# Patient Record
Sex: MALE
[De-identification: names, ages, dates, MRNs, and addresses within clinical notes are randomized per-mention and may not be internally consistent; named-entity substitution may affect disease eponyms.]

---

## 2018-07-31 ENCOUNTER — HOSPITAL ENCOUNTER (INPATIENT)
Dept: HOSPITAL 5 - ED | Age: 43
LOS: 2 days | Discharge: HOME | DRG: 558 | End: 2018-08-02
Attending: INTERNAL MEDICINE | Admitting: INTERNAL MEDICINE
Payer: COMMERCIAL

## 2018-07-31 DIAGNOSIS — M71.161: Primary | ICD-10-CM

## 2018-07-31 DIAGNOSIS — Z72.89: ICD-10-CM

## 2018-07-31 DIAGNOSIS — E87.1: ICD-10-CM

## 2018-07-31 DIAGNOSIS — L03.115: ICD-10-CM

## 2018-07-31 DIAGNOSIS — R73.9: ICD-10-CM

## 2018-07-31 DIAGNOSIS — D72.829: ICD-10-CM

## 2018-07-31 LAB
BASOPHILS # (AUTO): 0 K/MM3 (ref 0–0.1)
BASOPHILS NFR BLD AUTO: 0.3 % (ref 0–1.8)
BUN SERPL-MCNC: 18 MG/DL (ref 9–20)
BUN/CREAT SERPL: 26 %
CALCIUM SERPL-MCNC: 9 MG/DL (ref 8.4–10.2)
EOSINOPHIL # BLD AUTO: 0.1 K/MM3 (ref 0–0.4)
EOSINOPHIL NFR BLD AUTO: 0.9 % (ref 0–4.3)
HCT VFR BLD CALC: 43.6 % (ref 35.5–45.6)
HEMOLYSIS INDEX: 9
HGB BLD-MCNC: 14.6 GM/DL (ref 11.8–15.2)
LYMPHOCYTES # BLD AUTO: 2 K/MM3 (ref 1.2–5.4)
LYMPHOCYTES NFR BLD AUTO: 17 % (ref 13.4–35)
MCH RBC QN AUTO: 32 PG (ref 28–32)
MCHC RBC AUTO-ENTMCNC: 34 % (ref 32–34)
MCV RBC AUTO: 96 FL (ref 84–94)
MONOCYTES # (AUTO): 1.1 K/MM3 (ref 0–0.8)
MONOCYTES % (AUTO): 9.5 % (ref 0–7.3)
PLATELET # BLD: 269 K/MM3 (ref 140–440)
RBC # BLD AUTO: 4.53 M/MM3 (ref 3.65–5.03)

## 2018-07-31 PROCEDURE — 83036 HEMOGLOBIN GLYCOSYLATED A1C: CPT

## 2018-07-31 PROCEDURE — 80053 COMPREHEN METABOLIC PANEL: CPT

## 2018-07-31 PROCEDURE — 90715 TDAP VACCINE 7 YRS/> IM: CPT

## 2018-07-31 PROCEDURE — 36415 COLL VENOUS BLD VENIPUNCTURE: CPT

## 2018-07-31 PROCEDURE — 85025 COMPLETE CBC W/AUTO DIFF WBC: CPT

## 2018-07-31 PROCEDURE — 80048 BASIC METABOLIC PNL TOTAL CA: CPT

## 2018-07-31 RX ADMIN — Medication SCH ML: at 23:32

## 2018-07-31 RX ADMIN — OXYCODONE AND ACETAMINOPHEN PRN TAB: 5; 325 TABLET ORAL at 21:45

## 2018-07-31 RX ADMIN — AMPICILLIN AND SULBACTAM SCH MLS/HR: 1; 2 INJECTION, POWDER, FOR SOLUTION INTRAMUSCULAR; INTRAVENOUS at 21:47

## 2018-07-31 NOTE — XRAY REPORT
FINAL REPORT



PROCEDURE:  XR KNEE 1-2V RT



TECHNIQUE:  RIGHT knee radiograph, single  view.



HISTORY:  IMPAIRED GAIT 



COMPARISON:  No prior studies are available for comparison.



FINDINGS:  

Fracture (s) and/or Dislocation(s): None .



Joint space(s): Normal.



Soft tissues: There is prepatellar and pretibial soft tissue

swelling. There is no joint effusion. 



Bone mineralization: Normal. 



Foreign bodies: None. 



IMPRESSION:  

There is no acute bony abnormality.There is prepatellar and

pretibial soft tissue swelling. There is no joint effusion.

## 2018-07-31 NOTE — CAT SCAN REPORT
CT LOWER EXTREMITY RIGHT WITH CONTRAST



HISTORY: Right knee pain and swelling.



TECHNIQUE: Helical CT and 1.25 mm intervals following IV contrast was 

performed through the right knee.



FINDINGS: Moderate to severe soft tissue swelling is identified 

anterior to the patella and patellar tendon. There is suggestion of 

early abscess formation in this area measuring up to approximately 1.6 

x 0.5 x 1.5 cm. There appears to be a small sinus tract extending to 

the skin surface. Please correlate with the patient.



There is normal bone mineralization. Normal joint space. No evidence 

for fracture, bone lesion or bony destruction. No significant 

degenerative changes. No joint effusion. The patellar tendon is 

unremarkable.



IMPRESSION:

Prepatellar cellulitis with possible early subcutaneous abscess 

formation as described.

## 2018-07-31 NOTE — CONSULTATION
History of Present Illness





- HPI


Consult date: 07/31/18


Consult reason: joint pain


History of present illness: 





42 y/o male with c/o right knee pain and drainage x couple of days states he's 

a  and coming down once scraped knee on object...states pain became 

increasing worse over time and presented to the ED today...





Medications and Allergies


 Allergies











Allergy/AdvReac Type Severity Reaction Status Date / Time


 


No Known Allergies Allergy   Unverified 07/31/18 05:23











 Home Medications











 Medication  Instructions  Recorded  Confirmed  Last Taken  Type


 


No Known Home Medications [No  07/31/18 07/31/18 Unknown History





Reported Home Medications]     











Active Meds: 


Active Medications





Acetaminophen (Tylenol)  650 mg PO Q4H PRN


   PRN Reason: Pain MILD(1-3)/Fever >100.5/HA


Ampicillin Sodium/Sulbactam Sodium (Unasyn/Ns 3 Gm/100 Ml)  3 gm in 100 mls @ 

100 mls/hr IV Q6HR CRISTIANO; Protocol


Ondansetron HCl (Zofran)  4 mg IV Q8H PRN


   PRN Reason: Nausea And Vomiting


Oxycodone/Acetaminophen (Percocet 5/325)  1 tab PO Q6H PRN


   PRN Reason: Pain, Moderate (4-6)


Sodium Chloride (Sodium Chloride Flush Syringe 10 Ml)  10 ml IV BID CRISTIANO


Sodium Chloride (Sodium Chloride Flush Syringe 10 Ml)  10 ml IV PRN PRN


   PRN Reason: LINE FLUSH


Vancomycin HCl (Vancomycin Pharmacy To Dose)  1 each IV PKCONSULT CRISTIANO; Protocol











Physical Examination





- Physical exam


Narrative exam: 





Right knee - +erythema over patellar region, no fluctulence palpated small 

amount of drainage noted at pre-patellar bursa, good passive ROM at knee joint


Eyes: PERRL


ENT: Positive: clear oral mucosa


Respiratory effort: normal


Respiratory: bilateral: CTA


Rhythm: regular


Heart Sounds: Positive: S1 & S2


General gastrointestinal: Positive: soft, non-tender, non-distended, normal 

bowel sounds


Integumentary: clear, warm, dry


Neurologic: Positive: CNII-XII intact, moves all extremities, gait normal.  

Negative: focal deficits





- Cervical Spine


Neck pain: none


Tenderness with palpation: none


Full ROM: yes


ROM: flexion: normal


ROM: extension: normal


ROM: rotation right: normal


ROM: rotation left: normal


ROM: lateral flexion right: normal


ROM: lateral flexion left: normal





- Lumbar Spine


Back pain: none


Tenderness with palpation: none


Appearance: normal


Full ROM: yes


ROM: flexion: normal


ROM: extension: normal


ROM: rotation right: normal


ROM: rotation left: normal


ROM: lateral flexion right: normal


ROM: lateral flexion left: normal





Assessment and Plan





infected pre-patellar bursitis with overlying cellulitis


continue IVAB and observation, doubt if I&D need at this point

## 2018-07-31 NOTE — EMERGENCY DEPARTMENT REPORT
- General


Chief complaint: Skin/Abscess/Foreign Body


Stated complaint: RIGHT KNEE WOUND


Time Seen by Provider: 07/31/18 07:11


Source: patient


Mode of arrival: Ambulatory


Limitations: No Limitations





- History of Present Illness


Initial comments: 





This is a 43-year-old male nontoxic in appearance with no signs of distress 

presented to the ER with right knee pain and swelling with slight purulent 

drainage.  Patient stated that about a week ago he was working under and 

sustaining an injury but never came to the ER for follow-up.  Patient stated 

that the area of knee has been increase in pain and decreased range of motion.  

Patient denies any other trauma.  Patient denies any fever, chills, nausea, 

vomiting, chest pain, shortness of breath, headache or stiff neck.  Patient 

denies any allergies or significant past medical history.  Patient stated that 

he is not up-to-date with tetanus.


MD complaint: abscess/boil, other (cellulitis)


-: week(s) (1)


Tetanus Up to Date: no


Location: [facility]


Severity: mild


Severity scale (0 -10): 8


Quality: aching


Improves with: immobilization


Worsens with: movement


Associated symptoms: denies other symptoms


Treatments Prior to Arrival: none





- Related Data


 Home Medications











 Medication  Instructions  Recorded  Confirmed  Last Taken


 


No Known Home Medications [No  07/31/18 07/31/18 Unknown





Reported Home Medications]    











 Allergies











Allergy/AdvReac Type Severity Reaction Status Date / Time


 


No Known Allergies Allergy   Unverified 07/31/18 05:23














Abscess Boil HPI





- HPI


Chief Complaint: Skin/Abscess/Foreign Body


Stated Complaint: RIGHT KNEE WOUND


Time Seen by Provider: 07/31/18 07:11


Home Medications: 


 Home Medications











 Medication  Instructions  Recorded  Confirmed  Last Taken


 


No Known Home Medications [No  07/31/18 07/31/18 Unknown





Reported Home Medications]    











Allergies/Adverse Reactions: 


 Allergies











Allergy/AdvReac Type Severity Reaction Status Date / Time


 


No Known Allergies Allergy   Unverified 07/31/18 05:23














ED Review of Systems


ROS: 


Stated complaint: RIGHT KNEE WOUND


Other details as noted in HPI





Constitutional: denies: chills, fever


Eyes: denies: eye pain, eye discharge, vision change


ENT: denies: ear pain, throat pain


Respiratory: denies: cough, shortness of breath, wheezing


Cardiovascular: denies: chest pain, palpitations


Endocrine: no symptoms reported


Gastrointestinal: denies: abdominal pain, nausea, diarrhea


Genitourinary: denies: urgency, dysuria


Musculoskeletal: denies: back pain, joint swelling, arthralgia


Skin: denies: rash, lesions


Neurological: denies: headache, weakness, paresthesias


Psychiatric: denies: anxiety, depression


Hematological/Lymphatic: denies: easy bleeding, easy bruising





ED Past Medical Hx





- Surgical History


Additional Surgical History: RIGHT WRIST SURGERY 2008





- Social History


Smoking Status: Never Smoker


Substance Use Type: Alcohol





- Medications


Home Medications: 


 Home Medications











 Medication  Instructions  Recorded  Confirmed  Last Taken  Type


 


No Known Home Medications [No  07/31/18 07/31/18 Unknown History





Reported Home Medications]     














ED Physical Exam





- General


Limitations: No Limitations


General appearance: alert, in no apparent distress





- Head


Head exam: Present: atraumatic, normocephalic





- Eye


Eye exam: Present: normal appearance





- ENT


ENT exam: Present: mucous membranes moist





- Neck


Neck exam: Present: normal inspection





- Respiratory


Respiratory exam: Present: normal lung sounds bilaterally.  Absent: respiratory 

distress





- Cardiovascular


Cardiovascular Exam: Present: regular rate, normal rhythm.  Absent: systolic 

murmur, diastolic murmur, rubs, gallop





- GI/Abdominal


GI/Abdominal exam: Present: soft, normal bowel sounds





- Rectal


Rectal exam: Present: deferred





- Extremities Exam


Extremities exam: Present: normal inspection, full ROM, tenderness, normal 

capillary refill, joint swelling.  Absent: calf tenderness





- Expanded Lower Extremity Exam


  ** Right


Hip exam: Present: normal inspection, full ROM.  Absent: tenderness, swelling


Upper Leg exam: Present: normal inspection, full ROM.  Absent: tenderness, 

swelling


Knee exam: Present: normal inspection, tenderness, swelling, erythema, full 

knee extension.  Absent: full ROM, abrasion, laceration, ecchymosis, deformity, 

crepidus, dislocation, effusion, pain w/ pronation/supination, posterior draw 

sign, pain/laxity with valgus, pain/laxity with varus


Lower Leg exam: Present: normal inspection, full ROM.  Absent: tenderness, 

swelling


Ankle exam: Present: normal inspection, full ROM.  Absent: tenderness, swelling


Foot/Toe exam: Present: normal inspection, full ROM.  Absent: tenderness, 

swelling


Neuro vascular tendon exam: Present: no vascular compromise.  Absent: pulse 

deficit, abnormal cap refill, motor deficit, sensory deficit, tendon deficit, 

extremity cold to touch, pallor, abnormal 2-point discrimination, decreased fine

/light touch, foot drop, peroneal nerve deficit, significant pain with passive 

ROM of distal joint


Gait: Positive: observed and limited by pain





- Back Exam


Back exam: Present: normal inspection, full ROM





- Neurological Exam


Neurological exam: Present: alert, oriented X3, normal gait





- Psychiatric


Psychiatric exam: Present: normal affect, normal mood





- Skin


Skin exam: Present: warm, dry, intact, normal color.  Absent: rash





ED Course


 Vital Signs











  07/31/18 07/31/18





  05:15 09:54


 


Temperature 98.5 F 


 


Pulse Rate 73 


 


Respiratory 16 16





Rate  


 


Blood Pressure 140/89 


 


O2 Sat by Pulse 97 





Oximetry  














- Reevaluation(s)


Reevaluation #1: 





07/31/18 09:31


Patient is speaking in full sentences with no signs of distress noted.





ED Medical Decision Making





- Lab Data


Result diagrams: 


 07/31/18 07:23





 07/31/18 07:23





- Medical Decision Making





This is a 43-year-old male that presents with right knee cellulitis with 

possible abscess formation.  Patient was stable was examined by me.  CT scan of 

right lower extremity obtained and dictated by Dr. Carbajal with prepatellar 

cellulitis with possible early subcutaneous abscess formation.  Labs obtained.  

Vital signs are stable.  The patient with clindamycin IV.  Patient will be 

consulted with Dr. Cuellar for further evaluation and treatment.  Patient will 

be admitted with hospitalist Dr. Virk.  At time of admission, the patient does 

not seem toxic or ill in appearance.  No acute signs of distress noted.  

Patient agrees to admission treatment plan of care.  No further questions noted 

by the patient.


Critical care attestation.: 


If time is entered above; I have spent that time in minutes in the direct care 

of this critically ill patient, excluding procedure time.








ED Disposition


Clinical Impression: 


 Cellulitis of right knee





Disposition: DC-09 OP ADMIT IP TO THIS HOSP


Is pt being admited?: Yes


Condition: Stable


Referrals: 


PRIMARY CARE,MD [Primary Care Provider] - 3-5 Days

## 2018-08-01 LAB
ALBUMIN SERPL-MCNC: 3.5 G/DL (ref 3.9–5)
ALT SERPL-CCNC: 57 UNITS/L (ref 7–56)
BASOPHILS # (AUTO): 0 K/MM3 (ref 0–0.1)
BASOPHILS NFR BLD AUTO: 0.3 % (ref 0–1.8)
BUN SERPL-MCNC: 10 MG/DL (ref 9–20)
BUN/CREAT SERPL: 13 %
CALCIUM SERPL-MCNC: 8.8 MG/DL (ref 8.4–10.2)
EOSINOPHIL # BLD AUTO: 0.1 K/MM3 (ref 0–0.4)
EOSINOPHIL NFR BLD AUTO: 1.6 % (ref 0–4.3)
HCT VFR BLD CALC: 41.5 % (ref 35.5–45.6)
HEMOLYSIS INDEX: 4
HGB BLD-MCNC: 14.2 GM/DL (ref 11.8–15.2)
LYMPHOCYTES # BLD AUTO: 1.9 K/MM3 (ref 1.2–5.4)
LYMPHOCYTES NFR BLD AUTO: 22.8 % (ref 13.4–35)
MCH RBC QN AUTO: 33 PG (ref 28–32)
MCHC RBC AUTO-ENTMCNC: 34 % (ref 32–34)
MCV RBC AUTO: 96 FL (ref 84–94)
MONOCYTES # (AUTO): 1 K/MM3 (ref 0–0.8)
MONOCYTES % (AUTO): 12.2 % (ref 0–7.3)
PLATELET # BLD: 267 K/MM3 (ref 140–440)
RBC # BLD AUTO: 4.33 M/MM3 (ref 3.65–5.03)

## 2018-08-01 RX ADMIN — VANCOMYCIN HYDROCHLORIDE SCH MLS/HR: 100 INJECTION, POWDER, LYOPHILIZED, FOR SOLUTION INTRAVENOUS at 10:17

## 2018-08-01 RX ADMIN — AMPICILLIN AND SULBACTAM SCH MLS/HR: 1; 2 INJECTION, POWDER, FOR SOLUTION INTRAMUSCULAR; INTRAVENOUS at 05:35

## 2018-08-01 RX ADMIN — OXYCODONE AND ACETAMINOPHEN PRN TAB: 5; 325 TABLET ORAL at 22:25

## 2018-08-01 RX ADMIN — AMPICILLIN AND SULBACTAM SCH MLS/HR: 1; 2 INJECTION, POWDER, FOR SOLUTION INTRAMUSCULAR; INTRAVENOUS at 17:29

## 2018-08-01 RX ADMIN — AMPICILLIN AND SULBACTAM SCH: 1; 2 INJECTION, POWDER, FOR SOLUTION INTRAMUSCULAR; INTRAVENOUS at 00:59

## 2018-08-01 RX ADMIN — VANCOMYCIN HYDROCHLORIDE SCH MLS/HR: 100 INJECTION, POWDER, LYOPHILIZED, FOR SOLUTION INTRAVENOUS at 21:43

## 2018-08-01 RX ADMIN — AMPICILLIN AND SULBACTAM SCH MLS/HR: 1; 2 INJECTION, POWDER, FOR SOLUTION INTRAMUSCULAR; INTRAVENOUS at 12:49

## 2018-08-01 RX ADMIN — Medication SCH ML: at 10:18

## 2018-08-01 RX ADMIN — Medication SCH ML: at 21:48

## 2018-08-01 NOTE — PROGRESS NOTE
Assessment and Plan


Assessment and plan: 


Cellulitis right knee.


Admitted to med/surg floor.


Continue Unasyn and Vancomycin


patient evaluated by Dr. Cuellar





Pre-patellar bursitis.





Leukocytosis due to  cellulitis.





Full code status.














History


Interval history: 


right knee pain








Hospitalist Physical





- Physical exam


Narrative exam: 


Constitutional; Not in acute distress, ill looking,


HEENT: Atraumatic,  normocephalic


Neck: supple, no lymphadenopathy, JVD 


Lungs: Clear to auscultation, bilaterally, no wheeze


CVS; S1-S2 regular, no murmurs, rubs or gallop,


Abdomen; soft, non-tender, mild distended,bowel sounds are normal,


Musculoskeletal; Erythema, wound right knee, no clubbing, no cyanosis, 


CNS: awake, alert,oriented x3, no focal neurological signs











- Constitutional


Vitals: 


 











Temp Pulse Resp BP Pulse Ox


 


 98.7 F   67   16   126/78   98 


 


 08/01/18 05:56  08/01/18 05:56  08/01/18 05:56  08/01/18 05:56  08/01/18 05:56














Results





- Labs


CBC & Chem 7: 


 08/01/18 05:17





 08/01/18 05:17


Labs: 


 Laboratory Last Values











WBC  8.5 K/mm3 (4.5-11.0)   08/01/18  05:17    


 


RBC  4.33 M/mm3 (3.65-5.03)   08/01/18  05:17    


 


Hgb  14.2 gm/dl (11.8-15.2)   08/01/18  05:17    


 


Hct  41.5 % (35.5-45.6)   08/01/18  05:17    


 


MCV  96 fl (84-94)  H  08/01/18  05:17    


 


MCH  33 pg (28-32)  H  08/01/18  05:17    


 


MCHC  34 % (32-34)   08/01/18  05:17    


 


RDW  13.4 % (13.2-15.2)   08/01/18  05:17    


 


Plt Count  267 K/mm3 (140-440)   08/01/18  05:17    


 


Lymph % (Auto)  22.8 % (13.4-35.0)   08/01/18  05:17    


 


Mono % (Auto)  12.2 % (0.0-7.3)  H  08/01/18  05:17    


 


Eos % (Auto)  1.6 % (0.0-4.3)   08/01/18  05:17    


 


Baso % (Auto)  0.3 % (0.0-1.8)   08/01/18  05:17    


 


Lymph #  1.9 K/mm3 (1.2-5.4)   08/01/18  05:17    


 


Mono #  1.0 K/mm3 (0.0-0.8)  H  08/01/18  05:17    


 


Eos #  0.1 K/mm3 (0.0-0.4)   08/01/18  05:17    


 


Baso #  0.0 K/mm3 (0.0-0.1)   08/01/18  05:17    


 


Seg Neutrophils %  63.1 % (40.0-70.0)   08/01/18  05:17    


 


Seg Neutrophils #  5.4 K/mm3 (1.8-7.7)   08/01/18  05:17    


 


Sodium  139 mmol/L (137-145)   08/01/18  05:17    


 


Potassium  4.9 mmol/L (3.6-5.0)   08/01/18  05:17    


 


Chloride  99.5 mmol/L ()   08/01/18  05:17    


 


Carbon Dioxide  26 mmol/L (22-30)   08/01/18  05:17    


 


Anion Gap  18 mmol/L  08/01/18  05:17    


 


BUN  10 mg/dL (9-20)   08/01/18  05:17    


 


Creatinine  0.8 mg/dL (0.8-1.5)   08/01/18  05:17    


 


Estimated GFR  > 60 ml/min  08/01/18  05:17    


 


BUN/Creatinine Ratio  13 %  08/01/18  05:17    


 


Glucose  116 mg/dL ()  H  08/01/18  05:17    


 


Hemoglobin A1c  6.0 % (4-6)   07/31/18  21:03    


 


Calcium  8.8 mg/dL (8.4-10.2)   08/01/18  05:17    


 


Total Bilirubin  0.60 mg/dL (0.1-1.2)   08/01/18  05:17    


 


AST  36 units/L (5-40)   08/01/18  05:17    


 


ALT  57 units/L (7-56)  H  08/01/18  05:17    


 


Alkaline Phosphatase  107 units/L ()   08/01/18  05:17    


 


Total Protein  7.3 g/dL (6.3-8.2)   08/01/18  05:17    


 


Albumin  3.5 g/dL (3.9-5)  L  08/01/18  05:17    


 


Albumin/Globulin Ratio  0.9 %  08/01/18  05:17

## 2018-08-01 NOTE — HISTORY AND PHYSICAL REPORT
CHIEF COMPLAINT:  Right knee pain and swelling for 4 days.



HISTORY OF PRESENT ILLNESS:  A 43-year-old  male with no significant

past medical history, comes in for right knee swelling and slight discharge from

the right knee swelling.  The patient sustained an injury while working as a

.  Scraped his right knee and since then, the swelling of the knee has

progressively increased and more painful.  Slight discharge present.  It is not

clear whether it is pus or serous discharge.  Pain is about 8 on a scale of

1-10.



PAST MEDICAL HISTORY:  None.



PAST SURGICAL HISTORY:  None except for right wrist surgery in 2008.



SOCIAL HISTORY:  Does not smoke.  Alcohol occasionally.



FAMILY HISTORY:  No hypertension, no diabetes.



REVIEW OF SYSTEMS:  Significant for right knee swelling and pain.  Otherwise,

review of systems negative.



PHYSICAL EXAMINATION:

GENERAL:  Young male, cooperative during examination.

VITAL SIGNS:  Blood pressure is 134/84, temperature is 98.4, pulse is 70, sats

are 99%.

HEENT:  Unremarkable.  Pupils equal and reactive.

NECK:  Supple, no lymphadenopathy, no thyromegaly.

LUNGS:  Clear to auscultation and percussion.  Good air entry.

CARDIOVASCULAR:  S1, S2 heard.  No gallop, no murmur, no rub.  Apical impulse in

left fifth intercostal space and midclavicular line.

ABDOMEN:  Soft and benign.  No hepatosplenomegaly, no guarding, no rigidity. 

Hernial orifices are normal.

EXTREMITIES:  Right knee swollen and tender to touch.  Muscle swelling about 10

cm x 5 cm.  Small open wound present.  No discharge present.

SKIN:  Normal.

CENTRAL NERVOUS SYSTEM:  Alert and oriented x 4, nonfocal exam.



LABORATORY DATA:  Significant for white count of 12,000, H and H is 14.6 and

43.6.  Sodium is slightly low at 136, BUN and creatinine is 18 and 0.7, glucose

is 160.  A1c 6.0.



ASSESSMENT AND PLAN:

1.  Right knee cellulitis.  The patient initiated on IV Zosyn and IV vancomycin.

 Dr. Cuellar Orthopedics consulted.  The patient may not need I and D at this

point

2.  Hyperglycemia, borderline diabetes.  The patient may be discharged on

metformin 500 mg b.i.d.

3.  Hyponatremia, mild.  Should correct with IV fluids.

4.  Deep venous thrombosis prophylaxis, Lovenox 40 mg subcutaneous daily.





DD: 08/01/2018 08:02

DT: 08/01/2018 08:40

JOB# [id number]  [id number]

BECKY/OLI

## 2018-08-02 VITALS — DIASTOLIC BLOOD PRESSURE: 73 MMHG | SYSTOLIC BLOOD PRESSURE: 124 MMHG

## 2018-08-02 RX ADMIN — AMPICILLIN AND SULBACTAM SCH MLS/HR: 1; 2 INJECTION, POWDER, FOR SOLUTION INTRAMUSCULAR; INTRAVENOUS at 05:32

## 2018-08-02 RX ADMIN — AMPICILLIN AND SULBACTAM SCH MLS/HR: 1; 2 INJECTION, POWDER, FOR SOLUTION INTRAMUSCULAR; INTRAVENOUS at 13:19

## 2018-08-02 RX ADMIN — Medication SCH ML: at 10:00

## 2018-08-02 RX ADMIN — VANCOMYCIN HYDROCHLORIDE SCH MLS/HR: 100 INJECTION, POWDER, LYOPHILIZED, FOR SOLUTION INTRAVENOUS at 10:00

## 2018-08-02 RX ADMIN — AMPICILLIN AND SULBACTAM SCH MLS/HR: 1; 2 INJECTION, POWDER, FOR SOLUTION INTRAMUSCULAR; INTRAVENOUS at 00:44

## 2018-08-02 RX ADMIN — OXYCODONE AND ACETAMINOPHEN PRN TAB: 5; 325 TABLET ORAL at 11:49

## 2018-08-02 NOTE — DISCHARGE SUMMARY
Providers





- Providers


Date of Admission: 


07/31/18 14:16





Date of discharge: 08/02/18


Attending physician: 


VICTORIA GIBSON





 





07/31/18 14:16


Consult to Physician [CONS] Stat 


   Comment: 


   Consulting Provider: CLIFTON CARPENTER


   Physician Instructions: 


   Reason For Exam: cellulitis of right knee pain





08/01/18 08:50


Consult to Wound/ET Nurse [CONS] Urgent 


   Reason For Exam: wound eval











Primary care physician: 


PRIMARY CARE MD








Hospitalization


Condition: Fair





Core Measure Documentation





- Palliative Care


Palliative Care/ Comfort Measures: Not Applicable





- Core Measures


Any of the following diagnoses?: none





Exam





- Constitutional


Vitals: 


 











Temp Pulse Resp BP Pulse Ox


 


 98.1 F   76   18   124/73   94 


 


 08/02/18 12:00  08/02/18 12:00  08/02/18 12:00  08/02/18 12:00  08/02/18 12:00














Plan


Activity: advance as tolerated


Diet: low fat, low cholesterol, low salt


Additional Instructions: 1.Follow up with PCP or [facility] in 1 week.  

2.Follow up with Nas Vasquez in 1 week


Follow up with: 


PRIMARY CARE,MD [Primary Care Provider] - 3-5 Days